# Patient Record
Sex: FEMALE | URBAN - METROPOLITAN AREA
[De-identification: names, ages, dates, MRNs, and addresses within clinical notes are randomized per-mention and may not be internally consistent; named-entity substitution may affect disease eponyms.]

---

## 2022-12-14 ENCOUNTER — OFFICE VISIT (OUTPATIENT)
Dept: URBAN - METROPOLITAN AREA CLINIC 3 | Facility: CLINIC | Age: 67
End: 2022-12-14
Payer: MEDICARE

## 2022-12-14 DIAGNOSIS — H25.13 AGE-RELATED NUCLEAR CATARACT, BILATERAL: Primary | ICD-10-CM

## 2022-12-14 DIAGNOSIS — E11.9 DIABETES MELLITUS TYPE 2 WITHOUT MENTION OF COMPLICATION: ICD-10-CM

## 2022-12-14 DIAGNOSIS — H43.813 VITREOUS DEGENERATION, BILATERAL: ICD-10-CM

## 2022-12-14 PROCEDURE — 99214 OFFICE O/P EST MOD 30 MIN: CPT | Performed by: OPTOMETRIST

## 2022-12-14 ASSESSMENT — INTRAOCULAR PRESSURE
OD: 11
OS: 9

## 2022-12-14 NOTE — IMPRESSION/PLAN
Impression: Age-related nuclear cataract, bilateral: H25.13. Plan: Plan to perform cataract surgery in both eyes with the RIGHT  eye first: Discussed the risks, benefits, and alternatives of cataract surgery. The patient stated a full understanding and a desire to proceed with the procedure. Advised against driving until complete. Reviewed the increased risk of retinal detachment after cataract surgery and reviewed retinal detachment and general warnings and to contact us immediately should any of those develop. Cataracts affecting driving at night due to increased glare/starbursts from oncoming traffic. Patient cannot see television, signs and labels as a result of the cataracts. Patient is candidate/interested standard lens, multifocal lens, toric lens , ORA and laser assisted cataract surgery.

## 2022-12-14 NOTE — IMPRESSION/PLAN
Impression: Diabetes mellitus Type 2 without mention of complication: P42.9. Plan: Discussed the pathophysiology of diabetes and its effect on the eye. Stressed the importance of strong glucose control. Advised of importance of scheduled dilated examinations, and to contact us immediately for any problems or concerns.

## 2023-02-07 ENCOUNTER — TESTING ONLY (OUTPATIENT)
Dept: URBAN - METROPOLITAN AREA CLINIC 3 | Facility: CLINIC | Age: 68
End: 2023-02-07
Payer: MEDICARE

## 2023-02-07 DIAGNOSIS — H25.13 AGE-RELATED NUCLEAR CATARACT, BILATERAL: Primary | ICD-10-CM

## 2023-03-01 ENCOUNTER — Encounter (OUTPATIENT)
Dept: URBAN - METROPOLITAN AREA SURGERY 2 | Facility: SURGERY | Age: 68
End: 2023-03-01
Payer: MEDICARE

## 2023-03-01 ENCOUNTER — PROCEDURE (OUTPATIENT)
Dept: URBAN - METROPOLITAN AREA SURGERY 1 | Facility: SURGERY | Age: 68
End: 2023-03-01
Payer: MEDICARE

## 2023-03-01 PROCEDURE — 66984 XCAPSL CTRC RMVL W/O ECP: CPT | Performed by: OPHTHALMOLOGY

## 2023-03-02 ENCOUNTER — POST-OPERATIVE VISIT (OUTPATIENT)
Dept: URBAN - METROPOLITAN AREA CLINIC 3 | Facility: CLINIC | Age: 68
End: 2023-03-02
Payer: MEDICARE

## 2023-03-02 DIAGNOSIS — Z48.810 ENCOUNTER FOR SURGICAL AFTERCARE FOLLOWING SURGERY ON A SENSE ORGAN: Primary | ICD-10-CM

## 2023-03-02 PROCEDURE — 99024 POSTOP FOLLOW-UP VISIT: CPT | Performed by: OPTOMETRIST

## 2023-03-02 ASSESSMENT — INTRAOCULAR PRESSURE
OD: 12
OS: 14

## 2023-03-02 NOTE — IMPRESSION/PLAN
Impression: S/P Cataract Extraction by phacoemulsification with IOL placement OD - 1 Day. Encounter for surgical aftercare following surgery on a sense organ  Z48.810. Plan: Use Prednisolone  four times a day for one week then three times a day for one week then two times a day for one week and one time a day for one week and stop. Advised no heavy lifting or strenuous activity for one week. Advised no swimming for three weeks. Use eye shield at night for the first week. Patient advised to call immediately for any problems including, but not limited to, pain, redness, and decreased vision. Advised to bring eye drops to each visit. Patient stated an understanding of all the instructions. Follow-up in approximately one week / prn.

## 2023-03-08 ENCOUNTER — PROCEDURE (OUTPATIENT)
Dept: URBAN - METROPOLITAN AREA SURGERY 1 | Facility: SURGERY | Age: 68
End: 2023-03-08
Payer: MEDICARE

## 2023-03-08 ENCOUNTER — Encounter (OUTPATIENT)
Dept: URBAN - METROPOLITAN AREA SURGERY 2 | Facility: SURGERY | Age: 68
End: 2023-03-08
Payer: MEDICARE

## 2023-03-08 PROCEDURE — 66984 XCAPSL CTRC RMVL W/O ECP: CPT | Performed by: OPHTHALMOLOGY

## 2023-03-09 ENCOUNTER — POST-OPERATIVE VISIT (OUTPATIENT)
Dept: URBAN - METROPOLITAN AREA CLINIC 3 | Facility: CLINIC | Age: 68
End: 2023-03-09
Payer: MEDICARE

## 2023-03-09 DIAGNOSIS — Z96.1 PRESENCE OF INTRAOCULAR LENS: Primary | ICD-10-CM

## 2023-03-09 PROCEDURE — 99024 POSTOP FOLLOW-UP VISIT: CPT | Performed by: OPTOMETRIST

## 2023-03-09 ASSESSMENT — INTRAOCULAR PRESSURE
OS: 11
OD: 12

## 2023-03-09 NOTE — IMPRESSION/PLAN
Impression: S/P Cataract Extraction by phacoemulsification with IOL placement OS - 1 Day. Presence of intraocular lens  Z96.1. Post operative instructions reviewed - Plan: Use Prednisolone OU four times a day for one week then three times a day for one week then two times a day for one week and one time a day for one week and stop. Advised no heavy lifting or strenuous activity for one week. Advised no swimming for three weeks. Use eye shield at night for the first week. Patient advised to call immediately for any problems including, but not limited to, pain, redness, and decreased vision. Advised to bring eye drops to each visit. Patient stated an understanding of all the instructions. --Advised patient to use artificial tears for comfort.

## 2023-03-21 ENCOUNTER — POST-OPERATIVE VISIT (OUTPATIENT)
Dept: URBAN - METROPOLITAN AREA CLINIC 3 | Facility: CLINIC | Age: 68
End: 2023-03-21
Payer: MEDICARE

## 2023-03-21 DIAGNOSIS — Z96.1 PRESENCE OF INTRAOCULAR LENS: Primary | ICD-10-CM

## 2023-03-21 PROCEDURE — 99024 POSTOP FOLLOW-UP VISIT: CPT | Performed by: OPTOMETRIST

## 2023-03-21 RX ORDER — PREDNISOLONE ACETATE 10 MG/ML
1 % SUSPENSION/ DROPS OPHTHALMIC
Qty: 5 | Refills: 0 | Status: INACTIVE
Start: 2023-03-21 | End: 2023-04-10

## 2023-03-21 ASSESSMENT — INTRAOCULAR PRESSURE
OS: 17
OD: 17

## 2023-03-21 NOTE — IMPRESSION/PLAN
Impression:  Presence of intraocular lens  Z96.1. Plan: Discontinue [place] drops. Start systane ultra QID OU. Use Prednisolone three times a day for one week then two times a day for one week and one time a day for one week and stop. Advised no heavy lifting or strenuous activity for one week. Advised no swimming for three weeks. Use eye shield at night for the first week. Patient advised to call immediately for any problems including, but not limited to, pain, redness, and decreased vision. Advised to bring eye drops to each visit. Patient stated an understanding of all the instructions. --Advised patient to use artificial tears for comfort.

## 2023-06-21 ENCOUNTER — OFFICE VISIT (OUTPATIENT)
Dept: URBAN - METROPOLITAN AREA CLINIC 3 | Facility: CLINIC | Age: 68
End: 2023-06-21
Payer: MEDICARE

## 2023-06-21 DIAGNOSIS — Z96.1 PRESENCE OF INTRAOCULAR LENS: ICD-10-CM

## 2023-06-21 DIAGNOSIS — E11.9 DIABETES MELLITUS TYPE 2 WITHOUT MENTION OF COMPLICATION: Primary | ICD-10-CM

## 2023-06-21 DIAGNOSIS — H11.153 PINGUECULA, BILATERAL: ICD-10-CM

## 2023-06-21 DIAGNOSIS — H43.813 VITREOUS DEGENERATION, BILATERAL: ICD-10-CM

## 2023-06-21 DIAGNOSIS — H10.45 OTHER CHRONIC ALLERGIC CONJUNCTIVITIS: ICD-10-CM

## 2023-06-21 PROCEDURE — 99214 OFFICE O/P EST MOD 30 MIN: CPT | Performed by: OPTOMETRIST

## 2023-06-21 RX ORDER — PREDNISOLONE ACETATE 10 MG/ML
1 % SUSPENSION/ DROPS OPHTHALMIC
Qty: 10 | Refills: 0 | Status: INACTIVE
Start: 2023-06-21 | End: 2023-07-22

## 2023-06-21 ASSESSMENT — INTRAOCULAR PRESSURE
OS: 11
OD: 11

## 2023-06-21 NOTE — IMPRESSION/PLAN
Impression: Diabetes mellitus Type 2 without mention of complication: N46.6. Plan: Discussed the pathophysiology of diabetes and its effect on the eye. Stressed the importance of strong glucose control. Advised of importance of scheduled dilated examinations, and to contact us immediately for any problems or concerns.

## 2023-07-18 ENCOUNTER — OFFICE VISIT (OUTPATIENT)
Dept: URBAN - METROPOLITAN AREA CLINIC 3 | Facility: CLINIC | Age: 68
End: 2023-07-18

## 2023-07-18 DIAGNOSIS — H52.223 REGULAR ASTIGMATISM, BILATERAL: ICD-10-CM

## 2023-07-18 DIAGNOSIS — H11.153 PINGUECULA, BILATERAL: ICD-10-CM

## 2023-07-18 DIAGNOSIS — H26.493 OTHER SECONDARY CATARACT, BILATERAL: ICD-10-CM

## 2023-07-18 DIAGNOSIS — H04.123 DRY EYE SYNDROME OF BILATERAL LACRIMAL GLANDS: ICD-10-CM

## 2023-07-18 DIAGNOSIS — H10.45 OTHER CHRONIC ALLERGIC CONJUNCTIVITIS: Primary | ICD-10-CM

## 2023-07-18 PROCEDURE — 99212 OFFICE O/P EST SF 10 MIN: CPT | Performed by: OPTOMETRIST

## 2023-07-18 ASSESSMENT — VISUAL ACUITY
OS: 20/25
OD: 20/25

## 2023-07-18 ASSESSMENT — INTRAOCULAR PRESSURE
OS: 13
OD: 11

## 2023-07-18 NOTE — IMPRESSION/PLAN
Impression: Other chronic allergic conjunctivitis: H10.45. Plan: Resolved. Finish prednisolone acetate as prescribed. Return immediately for increased pain, increased redness, or decreased vision.

## 2024-07-18 ENCOUNTER — OFFICE VISIT (OUTPATIENT)
Dept: URBAN - METROPOLITAN AREA CLINIC 3 | Facility: CLINIC | Age: 69
End: 2024-07-18
Payer: MEDICARE

## 2024-07-18 DIAGNOSIS — Z96.1 PRESENCE OF INTRAOCULAR LENS: ICD-10-CM

## 2024-07-18 DIAGNOSIS — H04.123 DRY EYE SYNDROME OF BILATERAL LACRIMAL GLANDS: ICD-10-CM

## 2024-07-18 DIAGNOSIS — H26.493 OTHER SECONDARY CATARACT, BILATERAL: ICD-10-CM

## 2024-07-18 DIAGNOSIS — H43.813 VITREOUS DEGENERATION, BILATERAL: ICD-10-CM

## 2024-07-18 DIAGNOSIS — E11.9 DIABETES MELLITUS TYPE 2 WITHOUT MENTION OF COMPLICATION: Primary | ICD-10-CM

## 2024-07-18 PROCEDURE — 99214 OFFICE O/P EST MOD 30 MIN: CPT | Performed by: OPTOMETRIST

## 2024-07-18 ASSESSMENT — INTRAOCULAR PRESSURE
OS: 11
OD: 10